# Patient Record
Sex: FEMALE | Race: OTHER | URBAN - METROPOLITAN AREA
[De-identification: names, ages, dates, MRNs, and addresses within clinical notes are randomized per-mention and may not be internally consistent; named-entity substitution may affect disease eponyms.]

---

## 2017-04-20 ENCOUNTER — APPOINTMENT (RX ONLY)
Dept: URBAN - METROPOLITAN AREA CLINIC 75 | Facility: CLINIC | Age: 67
Setting detail: DERMATOLOGY
End: 2017-04-20

## 2017-04-20 VITALS — HEIGHT: 66 IN | WEIGHT: 140 LBS

## 2017-04-20 DIAGNOSIS — L71.8 OTHER ROSACEA: ICD-10-CM

## 2017-04-20 DIAGNOSIS — Z71.89 OTHER SPECIFIED COUNSELING: ICD-10-CM

## 2017-04-20 DIAGNOSIS — D22 MELANOCYTIC NEVI: ICD-10-CM

## 2017-04-20 DIAGNOSIS — L82.1 OTHER SEBORRHEIC KERATOSIS: ICD-10-CM

## 2017-04-20 DIAGNOSIS — L81.4 OTHER MELANIN HYPERPIGMENTATION: ICD-10-CM

## 2017-04-20 PROBLEM — D22.5 MELANOCYTIC NEVI OF TRUNK: Status: ACTIVE | Noted: 2017-04-20

## 2017-04-20 PROBLEM — G43909 MIGRAINE, UNSPECIFIED, WITHOUT MENTION OF INTRACTABLE MIGRAINE WITHOUT MENTION OF STATUS MIGRAINOSUS: Status: ACTIVE | Noted: 2017-04-20

## 2017-04-20 PROBLEM — F41.9 ANXIETY DISORDER, UNSPECIFIED: Status: ACTIVE | Noted: 2017-04-20

## 2017-04-20 PROCEDURE — ? IN-HOUSE DISPENSING PHARMACY

## 2017-04-20 PROCEDURE — ? COUNSELING

## 2017-04-20 PROCEDURE — ? BENIGN DESTRUCTION COSMETIC

## 2017-04-20 PROCEDURE — ? PRESCRIPTION

## 2017-04-20 PROCEDURE — 99214 OFFICE O/P EST MOD 30 MIN: CPT

## 2017-04-20 RX ORDER — DOXYCYCLINE HYCLATE 20 MG/1
TABLET, FILM COATED ORAL
Qty: 180 | Refills: 3 | COMMUNITY
Start: 2017-04-20

## 2017-04-20 RX ADMIN — DOXYCYCLINE HYCLATE: 20 TABLET, FILM COATED ORAL at 00:00

## 2017-04-20 ASSESSMENT — LOCATION SIMPLE DESCRIPTION DERM
LOCATION SIMPLE: LEFT HAND
LOCATION SIMPLE: UPPER BACK
LOCATION SIMPLE: RIGHT UPPER BACK

## 2017-04-20 ASSESSMENT — LOCATION ZONE DERM
LOCATION ZONE: HAND
LOCATION ZONE: TRUNK

## 2017-04-20 ASSESSMENT — LOCATION DETAILED DESCRIPTION DERM
LOCATION DETAILED: RIGHT SUPERIOR MEDIAL UPPER BACK
LOCATION DETAILED: INFERIOR THORACIC SPINE
LOCATION DETAILED: 4TH WEB SPACE LEFT HAND

## 2017-04-20 NOTE — PROCEDURE: IN-HOUSE DISPENSING PHARMACY
Product 36 Amount/Unit (Numbers Only): 0
Product 30 Unit Type: mg
Detail Level: Zone
Name Of Product 3: Cornel / Clind Combo - 624004
Product 24 Application Directions: Apply to affected area one time a day.
Product 41 Price/Unit (In Dollars): 40.00
Name Of Product 5: Clind / Tret Combo Cream - 385359
Name Of Product 24: Triamcin 1% Ointment - 359340
Product 41 Application Directions: Apply to hyperpigmented area in the evening after moisturizer.
Product 6 Price/Unit (In Dollars): 50.00
Product 35 Application Directions: Apply to affected area two times a day.
Name Of Product 7: Sod Sulf 10% / Sulf 2% - 243057
Product 31 Units Dispensed: 1
Name Of Product 13: Clob 0.05% Cream - 134261
Render Product Pricing In Note: Yes
Name Of Product 12: Iodoquin / Param Combo - 692783
Product 4 Application Directions: Apply to affected area before moisturizer one time a day.
Name Of Product 21: Imiqui 5% / Levo 1% Gel - 645938
Name Of Product 35: Tacro 0.1% Ointment - 618809
Product 3 Application Directions: Apply to acne prone area after moisturizer one time a day.
Name Of Product 31: Ivermec 1% / Met 1% Gel - 679896
Name Of Product 8: Taza 0.1% Cream - 284383
Name Of Product 41: Hydroquin 6% Combo Cream - 878714
Product 2 Application Directions: Apply to affected area in the evening after moisturizer.  Avoid eyelids.
Name Of Product 1: Acne Body Wash - 514833
Product 51 Application Directions: Apply to affected nails daily for 10 months.
Name Of Product 2: Tret 0.05% Cream - 313628
Name Of Product 51: Anti- Fungal Nail Solution - 015255
Name Of Product 42: Kojic Melasma Cream - 273431
Product 5 Application Directions: Apply to affected area in evening after moisturizer. Avoid eyelids.
Product 21 Application Directions: Apply to affected area in the evening or every other evening.
Product 8 Application Directions: Apply to affected area in the evening after moisturizer.  Avoid eyelids.
Name Of Product 4: Acne Gel w/ Dapsone - 375635
Name Of Product 6: Adap Combo Cream - 894110
Name Of Product 11: Clob 0.05% Solution - 459059
Name Of Product 14: Dermatitis Topical Foam - 355502
Product 1 Application Directions: Use as face or body wash daily.

## 2017-04-20 NOTE — PROCEDURE: BENIGN DESTRUCTION COSMETIC
Price (Use Numbers Only, No Special Characters Or $): 0
Detail Level: Detailed
Post-Care Instructions: I reviewed with the patient in detail post-care instructions. Patient is to wear sunprotection, and avoid picking at any of the treated lesions. Pt may apply Vaseline to crusted or scabbing areas.
Consent: The patient's consent was obtained including but not limited to risks of crusting, scabbing, blistering, scarring, darker or lighter pigmentary change, recurrence, incomplete removal and infection.

## 2018-05-15 ENCOUNTER — RX ONLY (OUTPATIENT)
Age: 68
Setting detail: RX ONLY
End: 2018-05-15

## 2018-05-15 ENCOUNTER — APPOINTMENT (RX ONLY)
Dept: URBAN - METROPOLITAN AREA CLINIC 75 | Facility: CLINIC | Age: 68
Setting detail: DERMATOLOGY
End: 2018-05-15

## 2018-05-15 DIAGNOSIS — L71.8 OTHER ROSACEA: ICD-10-CM | Status: WORSENING

## 2018-05-15 DIAGNOSIS — L24 IRRITANT CONTACT DERMATITIS: ICD-10-CM

## 2018-05-15 PROBLEM — L24.4 IRRITANT CONTACT DERMATITIS DUE TO DRUGS IN CONTACT WITH SKIN: Status: ACTIVE | Noted: 2018-05-15

## 2018-05-15 PROCEDURE — ? PRESCRIPTION

## 2018-05-15 PROCEDURE — ? COUNSELING

## 2018-05-15 PROCEDURE — 99213 OFFICE O/P EST LOW 20 MIN: CPT

## 2018-05-15 PROCEDURE — ? IN-HOUSE DISPENSING PHARMACY

## 2018-05-15 PROCEDURE — ? TREATMENT REGIMEN

## 2018-05-15 RX ORDER — AZELAIC ACID 0.15 G/G
GEL TOPICAL
Qty: 1 | Refills: 6 | COMMUNITY
Start: 2018-05-15

## 2018-05-15 RX ORDER — AZELAIC ACID 0.15 G/G
GEL TOPICAL
Qty: 1 | Refills: 6

## 2018-05-15 RX ORDER — DOXYCYCLINE HYCLATE 100 MG/1
CAPSULE, GELATIN COATED ORAL
Qty: 60 | Refills: 0 | Status: ERX | COMMUNITY
Start: 2018-05-15

## 2018-05-15 RX ADMIN — DOXYCYCLINE HYCLATE: 100 CAPSULE, GELATIN COATED ORAL at 13:57

## 2018-05-15 RX ADMIN — AZELAIC ACID: 0.15 GEL TOPICAL at 14:04

## 2018-05-15 ASSESSMENT — LOCATION DETAILED DESCRIPTION DERM
LOCATION DETAILED: RIGHT CENTRAL MALAR CHEEK
LOCATION DETAILED: LEFT INFERIOR CENTRAL MALAR CHEEK
LOCATION DETAILED: RIGHT INFERIOR CENTRAL MALAR CHEEK

## 2018-05-15 ASSESSMENT — LOCATION SIMPLE DESCRIPTION DERM
LOCATION SIMPLE: RIGHT CHEEK
LOCATION SIMPLE: LEFT CHEEK

## 2018-05-15 ASSESSMENT — LOCATION ZONE DERM: LOCATION ZONE: FACE

## 2018-05-15 NOTE — PROCEDURE: TREATMENT REGIMEN
Samples Given: Cloderm samples given today - use twice daily x 1 week
Plan: After 1 week, go back to apex rosacea cream. If not improved in a week, consider oral ivermectin. If doing better on doxycycline at higher dose, back down to low dose of 20mg bid
Detail Level: Zone

## 2018-05-15 NOTE — PROCEDURE: IN-HOUSE DISPENSING PHARMACY
Product 72 Refills: 0
Product 1 Application Directions: Apply to affected area in evening after moisturizer. Avoid eyelids.
Render Product Pricing In Note: Yes
Product 37 Unit Type: mg
Product 8 Price/Unit (In Dollars): 40.00
Product 12 Amount/Unit (Numbers Only): 30
Product 1 Unit Type: grams
Product 51 Unit Type: cc's
Product 51 Amount/Unit (Numbers Only): 15
Product 9 Amount/Unit (Numbers Only): 30
Name Of Product 4: Taza 0.1% Cream - 268018
Product 4 Refills: 6
Product 9 Application Directions: Apply to affected areas one time daily or every other day.
Detail Level: Zone
Product 4 Price/Unit (In Dollars): 50.00
Product 15 Amount/Unit (Numbers Only): 60
Name Of Product 1: Clind/Tret Combo Cream - 061438
Name Of Product 2: Tret 0.05% Cream - 444956
Product 3 Application Directions: Apply to affected area before moisturizer one time a day.
Product 10 Application Directions: Apply to affected areas one time per day.
Product 12 Application Directions: Apply to face nightly after moisturizer.
Name Of Product 11: Tacro Ointment - 907400
Product 2 Application Directions: Apply to affected area in the evening after moisturizer.  Avoid eyelids.
Name Of Product 10: Clob Ointment - 776577
Name Of Product 3: Acne Gel w/ Dapsone 7.5% - 318495
Name Of Product 9: Imiqui/Levo Gel - 676412
Product 8 Units Dispensed: 1
Product 6 Application Directions: Apply to affected area after moisturizer one time a day.
Product 10 Price/Unit (In Dollars): 45.00
Name Of Product 12: Azelaic acid 15%/Niacinamide 4% - 995622
Name Of Product 5: Cornel/Clind Combo Gel - 481785
Product 10 Amount/Unit (Numbers Only): 60
Product 4 Application Directions: Apply to affected area in the evening after moisturizer.  Avoid eyelids.
Product 7 Application Directions: Apply to affected areas after moisturizer at night.
Name Of Product 6: Adap Combo Cream - 088927
Name Of Product 7: Hydroquin Combo Cream - 792883
Product 8 Application Directions: Apply to affected areas once daily.
Name Of Product 8: Ivermec Met Gel - 428298
Product 11 Application Directions: Apply to affected areas BID.
Product 5 Application Directions: Apply to acne prone areas after moisturizer one time daily.